# Patient Record
Sex: MALE | Race: WHITE | Employment: OTHER | URBAN - METROPOLITAN AREA
[De-identification: names, ages, dates, MRNs, and addresses within clinical notes are randomized per-mention and may not be internally consistent; named-entity substitution may affect disease eponyms.]

---

## 2018-06-28 ENCOUNTER — HOSPITAL ENCOUNTER (OUTPATIENT)
Dept: GENERAL RADIOLOGY | Age: 73
Discharge: HOME | End: 2018-06-28
Payer: COMMERCIAL

## 2018-06-28 DIAGNOSIS — R05.9 COUGH: ICD-10-CM

## 2018-06-28 PROCEDURE — 71046 X-RAY EXAM CHEST 2 VIEWS: CPT

## 2019-12-09 ENCOUNTER — HOSPITAL ENCOUNTER (OUTPATIENT)
Dept: ULTRASOUND IMAGING | Age: 74
Discharge: HOME | End: 2019-12-09
Attending: INTERNAL MEDICINE
Payer: COMMERCIAL

## 2019-12-09 DIAGNOSIS — R80.9 PROTEINURIA: ICD-10-CM

## 2019-12-09 PROCEDURE — 76770 US EXAM ABDO BACK WALL COMP: CPT

## 2020-01-15 ENCOUNTER — HOSPITAL ENCOUNTER (OUTPATIENT)
Dept: CT IMAGING | Age: 75
Discharge: HOME | End: 2020-01-15
Attending: INTERNAL MEDICINE

## 2020-01-20 ENCOUNTER — HOSPITAL ENCOUNTER (OUTPATIENT)
Dept: CT IMAGING | Age: 75
Setting detail: OBSERVATION
Discharge: HOME | End: 2020-01-21
Attending: INTERNAL MEDICINE | Admitting: INTERNAL MEDICINE
Payer: COMMERCIAL

## 2020-01-20 DIAGNOSIS — N18.2 CHRONIC KIDNEY DISEASE, STAGE II (MILD): ICD-10-CM

## 2020-01-20 DIAGNOSIS — I71.40 ABDOMINAL AORTIC ANEURYSM WITHOUT RUPTURE: ICD-10-CM

## 2020-01-20 PROBLEM — Z98.890 S/P BIOPSY: Status: ACTIVE | Noted: 2020-01-20

## 2020-01-20 LAB
ABO + RH BLD: NORMAL
BASOPHILS # BLD: 0 K/UL (ref 0–0.1)
BASOPHILS NFR BLD: 1 % (ref 0–1)
BLOOD GROUP ANTIBODIES SERPL: NORMAL
DIFFERENTIAL METHOD BLD: ABNORMAL
EOSINOPHIL # BLD: 0.1 K/UL (ref 0–0.4)
EOSINOPHIL NFR BLD: 2 % (ref 0–7)
ERYTHROCYTE [DISTWIDTH] IN BLOOD BY AUTOMATED COUNT: 13.1 % (ref 11.5–14.5)
HCT VFR BLD AUTO: 41.5 % (ref 36.6–50.3)
HCT VFR BLD AUTO: 43.1 % (ref 36.6–50.3)
HCT VFR BLD AUTO: 46.8 % (ref 36.6–50.3)
HGB BLD-MCNC: 16.1 G/DL (ref 12.1–17)
IMM GRANULOCYTES # BLD AUTO: 0 K/UL (ref 0–0.04)
IMM GRANULOCYTES NFR BLD AUTO: 1 % (ref 0–0.5)
LYMPHOCYTES # BLD: 1.7 K/UL (ref 0.8–3.5)
LYMPHOCYTES NFR BLD: 26 % (ref 12–49)
MCH RBC QN AUTO: 31.2 PG (ref 26–34)
MCHC RBC AUTO-ENTMCNC: 34.4 G/DL (ref 30–36.5)
MCV RBC AUTO: 90.7 FL (ref 80–99)
MONOCYTES # BLD: 0.7 K/UL (ref 0–1)
MONOCYTES NFR BLD: 11 % (ref 5–13)
NEUTS SEG # BLD: 3.8 K/UL (ref 1.8–8)
NEUTS SEG NFR BLD: 59 % (ref 32–75)
NRBC # BLD: 0 K/UL (ref 0–0.01)
NRBC BLD-RTO: 0 PER 100 WBC
PLATELET # BLD AUTO: 239 K/UL (ref 150–400)
PMV BLD AUTO: 8.6 FL (ref 8.9–12.9)
RBC # BLD AUTO: 5.16 M/UL (ref 4.1–5.7)
SPECIMEN EXP DATE BLD: NORMAL
WBC # BLD AUTO: 6.4 K/UL (ref 4.1–11.1)

## 2020-01-20 PROCEDURE — 85025 COMPLETE CBC W/AUTO DIFF WBC: CPT

## 2020-01-20 PROCEDURE — 77030003503 HC NDL BIOP TISS BD -B

## 2020-01-20 PROCEDURE — 85014 HEMATOCRIT: CPT

## 2020-01-20 PROCEDURE — 77030003666 HC NDL SPINAL BD -A

## 2020-01-20 PROCEDURE — 50200 RENAL BIOPSY PERQ: CPT

## 2020-01-20 PROCEDURE — 77030014115

## 2020-01-20 PROCEDURE — 36415 COLL VENOUS BLD VENIPUNCTURE: CPT

## 2020-01-20 PROCEDURE — 88342 IMHCHEM/IMCYTCHM 1ST ANTB: CPT

## 2020-01-20 PROCEDURE — 88350 IMFLUOR EA ADDL 1ANTB STN PX: CPT

## 2020-01-20 PROCEDURE — 88313 SPECIAL STAINS GROUP 2: CPT

## 2020-01-20 PROCEDURE — 86900 BLOOD TYPING SEROLOGIC ABO: CPT

## 2020-01-20 PROCEDURE — 88348 ELECTRON MICROSCOPY DX: CPT

## 2020-01-20 PROCEDURE — 88346 IMFLUOR 1ST 1ANTB STAIN PX: CPT

## 2020-01-20 PROCEDURE — 99218 HC RM OBSERVATION: CPT

## 2020-01-20 PROCEDURE — 88305 TISSUE EXAM BY PATHOLOGIST: CPT

## 2020-01-20 RX ORDER — CLONIDINE HYDROCHLORIDE 0.1 MG/1
0.1 TABLET ORAL AS NEEDED
Status: DISCONTINUED | OUTPATIENT
Start: 2020-01-20 | End: 2020-01-21 | Stop reason: HOSPADM

## 2020-01-20 RX ORDER — SODIUM CHLORIDE 0.9 % (FLUSH) 0.9 %
5-40 SYRINGE (ML) INJECTION AS NEEDED
Status: DISCONTINUED | OUTPATIENT
Start: 2020-01-20 | End: 2020-01-21 | Stop reason: HOSPADM

## 2020-01-20 RX ORDER — SODIUM CHLORIDE 0.9 % (FLUSH) 0.9 %
5-40 SYRINGE (ML) INJECTION EVERY 8 HOURS
Status: DISCONTINUED | OUTPATIENT
Start: 2020-01-20 | End: 2020-01-21 | Stop reason: HOSPADM

## 2020-01-20 RX ORDER — FENTANYL CITRATE 50 UG/ML
100 INJECTION, SOLUTION INTRAMUSCULAR; INTRAVENOUS
Status: DISCONTINUED | OUTPATIENT
Start: 2020-01-20 | End: 2020-01-20

## 2020-01-20 RX ORDER — METOPROLOL TARTRATE 50 MG/1
50 TABLET ORAL 2 TIMES DAILY
Status: DISCONTINUED | OUTPATIENT
Start: 2020-01-20 | End: 2020-01-21 | Stop reason: HOSPADM

## 2020-01-20 RX ORDER — MIDAZOLAM HYDROCHLORIDE 1 MG/ML
5 INJECTION, SOLUTION INTRAMUSCULAR; INTRAVENOUS
Status: DISCONTINUED | OUTPATIENT
Start: 2020-01-20 | End: 2020-01-20

## 2020-01-20 RX ADMIN — Medication 10 ML: at 22:00

## 2020-01-20 RX ADMIN — Medication 10 ML: at 14:00

## 2020-01-20 NOTE — H&P
[facility] Observation Note        NAME: Lauren Forbes       :  1945       MRN:  [id number]     Date/Time: 2020    Risk of deterioration: low       Assessment:    Plan:  Proteinuria  HTN S/p ct guided renal biopsy  Rack urine  Follow H/H  DC in AM if stable  D/W pt-no heavy lifting or straining x 2 weeks after dc. Subjective:     Chief Complaint:  No pain at biopsy site     Review of Systems: no n/v/flank pain/hematuria    Objective:     VITALS:   Last 24hrs VS reviewed since prior progress note. Most recent are:  Visit Vitals  /83 (BP 1 Location: Left arm)   Pulse 60   Temp 98.3 °F (36.8 °C)   Resp 16   Ht 6' (1.829 m)   Wt 98.9 kg (218 lb)   SpO2 97%   BMI 29.57 kg/m²     SpO2 Readings from Last 6 Encounters:   20 97%   12 98%   12 98%        No intake or output data in the 24 hours ending 20 1331         PHYSICAL EXAM:    General   well developed, well nourished, appears stated age, in no acute distress  Respiratory   Clear To Auscultation bilaterally   Cardiology  Regular Rate and Rythmn   Abdominal  Soft, non-tender, non-distended  Extremities  No clubbing, cyanosis, or edema. Pulses intact. Neurological  No focal neurological deficits noted  Psychological  Oriented x 3. Normal affect.               Lab Data Reviewed: (see below)    Medications Reviewed: (see below)    PMH/SH reviewed - no change compared to H&P  ______________________________________________  ___________________________________________________    Attending Physician: Janki Rodas MD     ____________________________________________________  MEDICATIONS:  Current Facility-Administered Medications   Medication Dose Route Frequency    sodium chloride (NS) flush 5-40 mL  5-40 mL IntraVENous Q8H    sodium chloride (NS) flush 5-40 mL  5-40 mL IntraVENous PRN    cloNIDine HCL (CATAPRES) tablet 0.1 mg  0.1 mg Oral PRN    metoprolol tartrate (LOPRESSOR) tablet 50 mg  50 mg Oral BID        LABS:  Recent Labs     01/20/20  0757   WBC 6.4   HGB 16.1   HCT 46.8        No results for input(s): NA, K, CL, CO2, BUN, CREA, GLU, CA, MG, PHOS, URICA, PTH, XPTHCT, PTHN, XPTHNT in the last 72 hours. No lab exists for component: IPTH  No results for input(s): SGOT, GPT, ALT, AP, TBIL, TBILI, TP, ALB, GLOB, GGT, AML, LPSE in the last 72 hours. No lab exists for component: AMYP, HLPSE  No results for input(s): INR, PTP, APTT, INREXT in the last 72 hours. No results for input(s): FE, TIBC, PSAT, FERR in the last 72 hours. No results for input(s): PH, PCO2, PO2 in the last 72 hours. No results for input(s): CPK, CKNDX, TROIQ in the last 72 hours.     No lab exists for component: CPKMB  Lab Results   Component Value Date/Time    Glucose (POC) 127 (H) 10/17/2009 09:33 AM

## 2020-01-20 NOTE — PROGRESS NOTES
Pt transported to via stretcher to room #1116  TRANSFER - OUT REPORT:    bedside report given to Ambreen Das, pt's nurse, on Robinson Howard  being transferred to room #0371 for ordered procedure of CT guided renal biopsy - pt requested no to have conscious sedation if he did not want it. Had medication in hand however pt did not want versed or fentanyl during the procedure. Pt denies pain or discomfort post procedure. Report consisted of patients Situation, Background, Assessment and Recommendations(SBAR). Information from the following report(s) Procedure Summary was reviewed with the receiving nurse. Lines: 20G LAC saline lock - type and screen and CBC placed in CC and sent to lab prior to procedure. Opportunity for questions and clarification was provided.

## 2020-01-20 NOTE — PROGRESS NOTES
Initial Nutrition Assessment:    INTERVENTIONS/RECOMMENDATIONS:   · Meals/Snacks: General/healthful diet: Continue cardiac diet as ordered. ASSESSMENT:   1/20:  Chart reviewed; med noted for biopsy. Hx of HTN, elevated cholesterol. MST trigger for weight loss >33 lbs. Visited pt at bedside; visitor present. Per visitor, reports pt recently experienced unintentional weight loss >30 lbs x 2 months without any change in po intake or routine. Also, reports weight has started increasing. Pt reports current weight ~205 lbs, documented weight 218 lbs. Pt reports MD encouraged sodium restriction due to renal concerns. RD briefly discussed strategies/tips to reduce sodium intake but pt not receptive at this time. Wife also reports she encourages pt to consume water. Pt reports mostly consumes coffee and milk. Not interested in any diet/lifetsyle change at this time. Diet Order: Cardiac  % Eaten:  No data found. %Supplement Intake:    Pertinent Medications: [x]Reviewed []Other  Pertinent Labs: [x]Reviewed []Other  Food Allergies: [x]None []Other   Last BM:    [x]Active     []Hyperactive  []Hypoactive       [] Absent BS  Skin:    [x] Intact   [] Incision  [] Breakdown  [] Other:    Anthropometrics:   Height: 6' (182.9 cm) Weight: 98.9 kg (218 lb)   IBW (%IBW):   ( ) UBW (%UBW):   (  %)   Last Weight Metrics:  Weight Loss Metrics 1/20/2020 4/19/2012 4/3/2012   Today's Wt 218 lb 218 lb 14.4 oz 214 lb   BMI 29.57 kg/m2 30.11 kg/m2 29.02 kg/m2       BMI: Body mass index is 29.57 kg/m². This BMI is indicative of:   []Underweight    []Normal    [x]Overweight    [] Obesity   [] Extreme Obesity (BMI>40)     Estimated Nutrition Needs (Based on):   8974 Kcals/day(BMR (1758) x 1. 3AF) , 99 g(1.0 g/kg bw) Protein  Carbohydrate:  At Least 130 g/day  Fluids: 2300 mL/day (1ml/kcal)    NUTRITION DIAGNOSES:   Problem:  No nutritional diagnosis at this time      Etiology: related to       Signs/Symptoms: as evidenced by NUTRITION INTERVENTIONS:  Meals/Snacks: General/healthful diet                  GOAL:   PO intake >50% of meals next 5-7 days    LEARNING NEEDS (Diet, Food/Nutrient-Drug Interaction):    [x] None Identified   [] Identified and Education Provided/Documented   [] Identified and Pt declined/was not appropriate     Cultureal, Jain, OR Ethnic Dietary Needs:    [x] None Identified   [] Identified and Addressed     [x] Interdisciplinary Care Plan Reviewed/Documented    [x] Discharge Planning:  Recommend cardiac diet per hx of HTN and elevated lipids      MONITORING /EVALUATION:   Food/Nutrient Intake Outcomes:  Total energy intake  Physical Signs/Symptoms Outcomes: Weight/weight change, Electrolyte and renal profile    NUTRITION RISK:    [x] Patient At Nutritional Risk        [] Patient Not At Nutritional Risk    PT SEEN FOR:    []  MD Consult: []Calorie Count      []Diabetic Diet Education        []Diet Education     []Electrolyte Management     []General Nutrition Management and Supplements     []Management of Tube Feeding     []TPN Recommendations    [x]  RN Referral:  [x]MST score >=2     []Enteral/Parenteral Nutrition PTA     []Pregnant: Gestational DM or Multigestation     []Pressure Ulcer/Wound Care needs        []  Low BMI  []  FABIENNE Meyer, [address] [place] [address]  Pager [phone]  Weekend Pager [phone]

## 2020-01-20 NOTE — H&P
Interventional Radiology History and Physical (Inpatient)    Patient: Jose Matthews 66 y.o. male     Referring Physician:  Natasha Stevens MD    Chief Complaint: No chief complaint on file. History of Present Illness: conscious sedation (Versed and fentanyl) for renal biopsy    History:  Past Medical History:   Diagnosis Date    Hypercholesteremia     Hypertension      No family history on file.   Social History     Socioeconomic History    Marital status:      Spouse name: Not on file    Number of children: Not on file    Years of education: Not on file    Highest education level: Not on file   Occupational History    Not on file   Social Needs    Financial resource strain: Not on file    Food insecurity:     Worry: Not on file     Inability: Not on file    Transportation needs:     Medical: Not on file     Non-medical: Not on file   Tobacco Use    Smoking status: Current Every Day Smoker     Packs/day: 2.00     Years: 60.00     Pack years: 120.00     Types: Cigarettes   Substance and Sexual Activity    Alcohol use: No    Drug use: Not on file    Sexual activity: Not on file   Lifestyle    Physical activity:     Days per week: Not on file     Minutes per session: Not on file    Stress: Not on file   Relationships    Social connections:     Talks on phone: Not on file     Gets together: Not on file     Attends [employer] service: Not on file     Active member of club or organization: Not on file     Attends meetings of clubs or organizations: Not on file     Relationship status: Not on file    Intimate partner violence:     Fear of current or ex partner: Not on file     Emotionally abused: Not on file     Physically abused: Not on file     Forced sexual activity: Not on file   Other Topics Concern    Not on file   Social History Narrative    Not on file       Allergies: No Known Allergies    Current Medications:  Current Outpatient Medications   Medication Sig    niacin (Barrera Dumont) 1,000 mg Tb24 tab Take  by mouth nightly.  metoprolol (LOPRESSOR) 50 mg tablet Take  by mouth two (2) times a day.  simvastatin (ZOCOR) 40 mg tablet Take  by mouth nightly.  aspirin 81 mg tablet Take 81 mg by mouth.  multivitamin (ONE A DAY) tablet Take 1 Tab by mouth daily.  Omega-3 Fatty Acids (FISH OIL) 300 mg Cap Take  by mouth. No current facility-administered medications for this encounter. Physical Exam:  Blood pressure 133/75, pulse 63, temperature 97.6 °F (36.4 °C), resp. rate 20, height 6' (1.829 m), weight 98.9 kg (218 lb), SpO2 99 %. GENERAL: alert, cooperative, no distress, appears stated age, LUNG: clear to auscultation bilaterally, HEART: distant sounds, regular rate and rhythm    Findings/Diagnosis: conscious sedation (Versed and fentanyl) for renal biopsy    Alerts:    Hospital Problems  Date Reviewed: 4/19/2012          Codes Class Noted POA    S/P biopsy ICD-10-CM: Z98.890  ICD-9-CM: V45.89  1/20/2020 Unknown              Laboratory:    No results for input(s): HGB, HGBEXT, HCT, HCTEXT, WBC, PLT, PLTEXT, INR, BUN, CREA, K, CRCLT, HGBEXT, HCTEXT, PLTEXT, INREXT in the last 72 hours. No lab exists for component: PTT, PT      Plan of Care/Planned Procedure:  Risks, benefits, and alternatives reviewed with patient and he agrees to proceed with the procedure. Full dictated report to follow.

## 2020-01-20 NOTE — DISCHARGE SUMMARY
[facility] DISCHARGE INSTRUCTIONS  NAME: Александр Sandoval   :  1945   MRN:  [id number]     Date/Time:  2020 1:37 PM    ADMIT DATE: 2020     DISCHARGE DATE: 20    ADMITTING DIAGNOSIS:  Proteinuria    DISCHARGE DIAGNOSIS:   s/p ct guided biopsy    MEDICATIONS:  Current Discharge Medication List      CONTINUE these medications which have NOT CHANGED    Details   niacin (NIASPAN) 1,000 mg Tb24 tab Take  by mouth nightly. metoprolol (LOPRESSOR) 50 mg tablet Take  by mouth two (2) times a day. simvastatin (ZOCOR) 40 mg tablet Take  by mouth nightly. aspirin 81 mg tablet Take 81 mg by mouth.        multivitamin (ONE A DAY) tablet Take 1 Tab by mouth daily. Omega-3 Fatty Acids (FISH OIL) 300 mg Cap Take  by mouth. · It is important that you take the medication exactly as they are prescribed. You may resume your asprin on sat. · Keep your medication in the bottles provided by the pharmacist and keep a list of the medication names, dosages, and times to be taken in your wallet. · Do not take other medications without consulting your doctor. Pain Management: per above medications    What to do at Home--any pain at biopsy site or bloody urine return to ED immediately    Recommended diet:  Regular Diet    Recommended activity: no heavy lifting for 2 weeks                                             No heavy straining for 2 weeks  If you experience any of the following symptoms then please call your primary care physician or return to the emergency room if you cannot get hold of your doctor:  Fever, chills, nausea, vomiting, diarrhea, change in mentation, falling, bleeding, shortness of breath, bruising or blood in urine. Follow Up:  Dr. Yevgeniy Mitchell obtained by :  I understand that if any problems occur once I am at home I am to contact my physician. I understand and acknowledge receipt of the instructions indicated above. Physician's or R.N.'s Signature                                                                  Date/Time                                                                                                                                                Patient or Representative Signature                                                          Date/Time  Amina Rodrigues MD

## 2020-01-20 NOTE — ROUTINE PROCESS
Dr. Sea Newsomepool Present for pre procedure consult and consent - questions reviewed with understanding - consent signed. Admitting orders placed and víctor benitez/Alan in bed placement, awaiting room assignment.

## 2020-01-20 NOTE — PROGRESS NOTES
TRANSFER - IN REPORT:    Verbal report received from Jeana Ramirez RN(name) on Prem Donahue  being received from X-ray recovery(unit) for routine progression of care      Report consisted of patients Situation, Background, Assessment and   Recommendations(SBAR). Information from the following report(s) SBAR, Kardex, MAR and Accordion was reviewed with the receiving nurse. Opportunity for questions and clarification was provided. Assessment will be completed upon patients arrival to unit and care assumed.

## 2020-01-20 NOTE — DISCHARGE SUMMARY
Physician Discharge Summary     Patient ID:  Jazmine Major  [id number]  59 y.o.  1945    Admit date: 1/20/2020    Discharge date and time: 1/21/20       Admitting Physician: Main Spicer    Discharge Physician: Main Spicer MD    Admission Diagnoses: S/P biopsy [Z98.890]    Discharge Diagnoses: as above    Discharged Condition: stable      Hospital Course: pt observed overnight following a ct guided renal biopsy. Urine was racked. Hgb stable  No flank pain/bruising noted on exam. Reviewed with pt/family no heavy lifting or straining x 2 weeks. ANy hematuria or flank pain return to ED. Resume aspirin on sat    Disposition: home    Patient Instructions:     Current Discharge Medication List      CONTINUE these medications which have NOT CHANGED    Details   niacin (NIASPAN) 1,000 mg Tb24 tab Take  by mouth nightly. metoprolol (LOPRESSOR) 50 mg tablet Take  by mouth two (2) times a day. simvastatin (ZOCOR) 40 mg tablet Take  by mouth nightly. aspirin 81 mg tablet Take 81 mg by mouth.        multivitamin (ONE A DAY) tablet Take 1 Tab by mouth daily. Omega-3 Fatty Acids (FISH OIL) 300 mg Cap Take  by mouth.                Data Review   CBC:   Lab Results   Component Value Date/Time    WBC 6.4 01/20/2020 07:57 AM    RBC 5.16 01/20/2020 07:57 AM    HGB 16.1 01/20/2020 07:57 AM    HCT 46.8 01/20/2020 07:57 AM    PLATELET 739 86/90/9127 07:57 AM   , BMP:   Lab Results   Component Value Date/Time    Glucose 114 (H) 12/10/2009 07:50 PM    Sodium 142 12/10/2009 07:50 PM    Potassium 4.3 12/10/2009 07:50 PM    Chloride 106 12/10/2009 07:50 PM    CO2 26 12/10/2009 07:50 PM    BUN 18 12/10/2009 07:50 PM    Creatinine 1.0 12/10/2009 07:50 PM    Calcium 9.7 12/10/2009 07:50 PM       Activity: No heavy lifting, pushing, pulling x 2 weeks    Diet: Regular    Wound Care: None needed    Follow-up with dr Michelle Lagos assi\    Signed:  Main Spicer MD  1/20/2020  1:34 PM

## 2020-01-21 VITALS
DIASTOLIC BLOOD PRESSURE: 86 MMHG | RESPIRATION RATE: 16 BRPM | SYSTOLIC BLOOD PRESSURE: 143 MMHG | WEIGHT: 218 LBS | BODY MASS INDEX: 29.53 KG/M2 | OXYGEN SATURATION: 99 % | TEMPERATURE: 98.3 F | HEART RATE: 64 BPM | HEIGHT: 72 IN

## 2020-01-21 PROCEDURE — 99218 HC RM OBSERVATION: CPT

## 2020-01-21 RX ADMIN — Medication 10 ML: at 06:00

## 2020-01-21 NOTE — PROGRESS NOTES
Patient request not to be disturbed. Patient states like to get some sleep. Informed patient will check on him but will not wake him up. Informed patient to call me when he gets up to go to BR so that I can rack his urine. Patient states understanding.

## 2020-01-21 NOTE — PROGRESS NOTES
Bedside shift change report given to [facility] (oncoming nurse) by Dylan Cleaning RN (offgoing nurse). Report included the following information SBAR, Kardex, ED Summary, Intake/Output, MAR and Recent Results.

## 2020-01-21 NOTE — PROGRESS NOTES
CRUZITO:   1) Home with f.u appts     8:41 AM- D/C order noted. Medicare pt has received, reviewed, and signed 2nd IM letter informing them of their right to appeal the discharge. Signed copy has been placed on pt bedside chart. Pt has follow up appts with his providers already arranged. Pt's wife is on her way. Pt is cleared to d/c from CM perspective, RN informed.      NICHOL Headley, [address] [facility]   [phone]

## 2020-01-21 NOTE — PROGRESS NOTES
I have reviewed discharge instructions with the PATIENT PARENT GUARDIAN: patient. The patient verbalized understanding. Discharge medications reviewed with patient and appropriate educational materials and side effects teaching were provided. Follow-up appointments reviewed. Opportunity for questions and clarification was provided. Venous access removed without difficulty. Patient's belongings gathered and sent with patient. Patient is ready for discharge.      Solis Miner

## 2020-02-01 ENCOUNTER — HOSPITAL ENCOUNTER (OUTPATIENT)
Dept: CT IMAGING | Age: 75
Discharge: HOME | End: 2020-02-01
Attending: INTERNAL MEDICINE
Payer: COMMERCIAL

## 2020-02-01 DIAGNOSIS — N03.2 CHRONIC MEMBRANOUS NEPHRITIS: ICD-10-CM

## 2020-02-01 DIAGNOSIS — R10.13 EPIGASTRIC PAIN: ICD-10-CM

## 2020-02-01 LAB — CREAT BLD-MCNC: 1.2 MG/DL (ref 0.6–1.3)

## 2020-02-01 PROCEDURE — 71260 CT THORAX DX C+: CPT

## 2020-02-01 PROCEDURE — 74011636320 HC RX REV CODE- 636/320

## 2020-02-01 PROCEDURE — 82565 ASSAY OF CREATININE: CPT

## 2020-02-01 PROCEDURE — 74177 CT ABD & PELVIS W/CONTRAST: CPT

## 2020-02-01 RX ORDER — SODIUM CHLORIDE 0.9 % (FLUSH) 0.9 %
10 SYRINGE (ML) INJECTION
Status: COMPLETED | OUTPATIENT
Start: 2020-02-01 | End: 2020-02-01

## 2020-02-01 RX ADMIN — Medication 10 ML: at 10:24

## 2020-02-01 RX ADMIN — IOPAMIDOL 100 ML: 755 INJECTION, SOLUTION INTRAVENOUS at 10:24

## 2020-04-15 ENCOUNTER — HOSPITAL ENCOUNTER (OUTPATIENT)
Dept: NUCLEAR MEDICINE | Age: 75
Discharge: HOME | End: 2020-04-15
Attending: RADIOLOGY
Payer: COMMERCIAL

## 2020-04-15 DIAGNOSIS — C61 MALIGNANT NEOPLASM OF PROSTATE (HCC): ICD-10-CM

## 2020-04-15 PROCEDURE — 78306 BONE IMAGING WHOLE BODY: CPT

## 2020-07-06 ENCOUNTER — HOSPITAL ENCOUNTER (OUTPATIENT)
Dept: RADIATION THERAPY | Age: 75
Discharge: HOME | End: 2020-07-06

## 2020-07-07 ENCOUNTER — HOSPITAL ENCOUNTER (OUTPATIENT)
Dept: RADIATION THERAPY | Age: 75
Discharge: HOME | End: 2020-07-07

## 2020-07-08 ENCOUNTER — HOSPITAL ENCOUNTER (OUTPATIENT)
Dept: RADIATION THERAPY | Age: 75
Discharge: HOME | End: 2020-07-08

## 2020-07-09 ENCOUNTER — HOSPITAL ENCOUNTER (OUTPATIENT)
Dept: RADIATION THERAPY | Age: 75
Discharge: HOME | End: 2020-07-09

## 2020-07-10 ENCOUNTER — HOSPITAL ENCOUNTER (OUTPATIENT)
Dept: RADIATION THERAPY | Age: 75
Discharge: HOME | End: 2020-07-10

## 2020-07-13 ENCOUNTER — HOSPITAL ENCOUNTER (OUTPATIENT)
Dept: RADIATION THERAPY | Age: 75
Discharge: HOME | End: 2020-07-13

## 2020-07-14 ENCOUNTER — HOSPITAL ENCOUNTER (OUTPATIENT)
Dept: RADIATION THERAPY | Age: 75
Discharge: HOME | End: 2020-07-14

## 2020-07-15 ENCOUNTER — HOSPITAL ENCOUNTER (OUTPATIENT)
Dept: RADIATION THERAPY | Age: 75
Discharge: HOME | End: 2020-07-15

## 2020-07-16 ENCOUNTER — HOSPITAL ENCOUNTER (OUTPATIENT)
Dept: RADIATION THERAPY | Age: 75
Discharge: HOME | End: 2020-07-16

## 2020-07-17 ENCOUNTER — HOSPITAL ENCOUNTER (OUTPATIENT)
Dept: RADIATION THERAPY | Age: 75
Discharge: HOME | End: 2020-07-17

## 2020-07-20 ENCOUNTER — HOSPITAL ENCOUNTER (OUTPATIENT)
Dept: RADIATION THERAPY | Age: 75
Discharge: HOME | End: 2020-07-20

## 2020-07-21 ENCOUNTER — HOSPITAL ENCOUNTER (OUTPATIENT)
Dept: RADIATION THERAPY | Age: 75
Discharge: HOME | End: 2020-07-21

## 2020-07-22 ENCOUNTER — HOSPITAL ENCOUNTER (OUTPATIENT)
Dept: RADIATION THERAPY | Age: 75
Discharge: HOME | End: 2020-07-22

## 2020-07-23 ENCOUNTER — HOSPITAL ENCOUNTER (OUTPATIENT)
Dept: RADIATION THERAPY | Age: 75
Discharge: HOME | End: 2020-07-23

## 2020-07-24 ENCOUNTER — HOSPITAL ENCOUNTER (OUTPATIENT)
Dept: RADIATION THERAPY | Age: 75
Discharge: HOME | End: 2020-07-24

## 2020-07-27 ENCOUNTER — HOSPITAL ENCOUNTER (OUTPATIENT)
Dept: RADIATION THERAPY | Age: 75
Discharge: HOME | End: 2020-07-27

## 2020-07-28 ENCOUNTER — HOSPITAL ENCOUNTER (OUTPATIENT)
Dept: RADIATION THERAPY | Age: 75
Discharge: HOME | End: 2020-07-28

## 2020-07-28 ENCOUNTER — TELEPHONE (OUTPATIENT)
Dept: RADIATION ONCOLOGY | Age: 75
End: 2020-07-28

## 2020-07-28 DIAGNOSIS — C61 PROSTATE CANCER (HCC): Primary | ICD-10-CM

## 2020-07-28 RX ORDER — DIPHENOXYLATE HYDROCHLORIDE AND ATROPINE SULFATE 2.5; .025 MG/1; MG/1
1 TABLET ORAL
Qty: 120 TAB | Refills: 0 | Status: SHIPPED | OUTPATIENT
Start: 2020-07-28

## 2020-07-29 ENCOUNTER — HOSPITAL ENCOUNTER (OUTPATIENT)
Dept: RADIATION THERAPY | Age: 75
Discharge: HOME | End: 2020-07-29

## 2020-07-30 ENCOUNTER — HOSPITAL ENCOUNTER (OUTPATIENT)
Dept: RADIATION THERAPY | Age: 75
Discharge: HOME | End: 2020-07-30

## 2020-07-31 ENCOUNTER — HOSPITAL ENCOUNTER (OUTPATIENT)
Dept: RADIATION THERAPY | Age: 75
Discharge: HOME | End: 2020-07-31

## 2020-08-03 ENCOUNTER — HOSPITAL ENCOUNTER (OUTPATIENT)
Dept: RADIATION THERAPY | Age: 75
Discharge: HOME | End: 2020-08-03

## 2020-08-04 ENCOUNTER — HOSPITAL ENCOUNTER (OUTPATIENT)
Dept: RADIATION THERAPY | Age: 75
Discharge: HOME | End: 2020-08-04

## 2020-08-05 ENCOUNTER — HOSPITAL ENCOUNTER (OUTPATIENT)
Dept: RADIATION THERAPY | Age: 75
Discharge: HOME | End: 2020-08-05

## 2020-08-07 ENCOUNTER — HOSPITAL ENCOUNTER (OUTPATIENT)
Dept: RADIATION THERAPY | Age: 75
Discharge: HOME | End: 2020-08-07

## 2020-08-10 ENCOUNTER — HOSPITAL ENCOUNTER (OUTPATIENT)
Dept: RADIATION THERAPY | Age: 75
Discharge: HOME | End: 2020-08-10

## 2020-08-11 ENCOUNTER — HOSPITAL ENCOUNTER (OUTPATIENT)
Dept: RADIATION THERAPY | Age: 75
Discharge: HOME | End: 2020-08-11

## 2020-08-12 ENCOUNTER — HOSPITAL ENCOUNTER (OUTPATIENT)
Dept: RADIATION THERAPY | Age: 75
Discharge: HOME | End: 2020-08-12

## 2020-08-13 ENCOUNTER — HOSPITAL ENCOUNTER (OUTPATIENT)
Dept: RADIATION THERAPY | Age: 75
Discharge: HOME | End: 2020-08-13

## 2020-08-14 ENCOUNTER — HOSPITAL ENCOUNTER (OUTPATIENT)
Dept: RADIATION THERAPY | Age: 75
Discharge: HOME | End: 2020-08-14

## 2020-08-17 ENCOUNTER — HOSPITAL ENCOUNTER (OUTPATIENT)
Dept: RADIATION THERAPY | Age: 75
Discharge: HOME | End: 2020-08-17

## 2020-08-18 ENCOUNTER — HOSPITAL ENCOUNTER (OUTPATIENT)
Dept: RADIATION THERAPY | Age: 75
Discharge: HOME | End: 2020-08-18

## 2020-08-19 ENCOUNTER — HOSPITAL ENCOUNTER (OUTPATIENT)
Dept: RADIATION THERAPY | Age: 75
Discharge: HOME | End: 2020-08-19

## 2020-08-20 ENCOUNTER — HOSPITAL ENCOUNTER (OUTPATIENT)
Dept: RADIATION THERAPY | Age: 75
Discharge: HOME | End: 2020-08-20

## 2020-08-21 ENCOUNTER — HOSPITAL ENCOUNTER (OUTPATIENT)
Dept: RADIATION THERAPY | Age: 75
Discharge: HOME | End: 2020-08-21

## 2020-08-24 ENCOUNTER — HOSPITAL ENCOUNTER (OUTPATIENT)
Dept: RADIATION THERAPY | Age: 75
Discharge: HOME | End: 2020-08-24

## 2020-08-25 ENCOUNTER — HOSPITAL ENCOUNTER (OUTPATIENT)
Dept: RADIATION THERAPY | Age: 75
Discharge: HOME | End: 2020-08-25

## 2020-08-26 ENCOUNTER — HOSPITAL ENCOUNTER (OUTPATIENT)
Dept: RADIATION THERAPY | Age: 75
Discharge: HOME | End: 2020-08-26

## 2020-08-27 ENCOUNTER — HOSPITAL ENCOUNTER (OUTPATIENT)
Dept: RADIATION THERAPY | Age: 75
Discharge: HOME | End: 2020-08-27

## 2020-08-28 ENCOUNTER — HOSPITAL ENCOUNTER (OUTPATIENT)
Dept: RADIATION THERAPY | Age: 75
Discharge: HOME | End: 2020-08-28

## 2020-08-31 ENCOUNTER — HOSPITAL ENCOUNTER (OUTPATIENT)
Dept: RADIATION THERAPY | Age: 75
Discharge: HOME | End: 2020-08-31

## 2020-09-01 ENCOUNTER — HOSPITAL ENCOUNTER (OUTPATIENT)
Dept: RADIATION THERAPY | Age: 75
Discharge: HOME | End: 2020-09-01

## 2020-09-02 ENCOUNTER — HOSPITAL ENCOUNTER (OUTPATIENT)
Dept: RADIATION THERAPY | Age: 75
Discharge: HOME | End: 2020-09-02

## 2020-09-03 ENCOUNTER — HOSPITAL ENCOUNTER (OUTPATIENT)
Dept: RADIATION THERAPY | Age: 75
Discharge: HOME | End: 2020-09-03

## 2020-09-04 ENCOUNTER — HOSPITAL ENCOUNTER (OUTPATIENT)
Dept: RADIATION THERAPY | Age: 75
Discharge: HOME | End: 2020-09-04

## 2020-09-08 ENCOUNTER — TELEPHONE (OUTPATIENT)
Dept: RADIATION ONCOLOGY | Age: 75
End: 2020-09-08

## 2020-09-08 RX ORDER — TAMSULOSIN HYDROCHLORIDE 0.4 MG/1
0.4 CAPSULE ORAL
Qty: 30 CAP | Refills: 11 | Status: SHIPPED | OUTPATIENT
Start: 2020-09-08

## 2022-03-18 PROBLEM — Z98.890 S/P BIOPSY: Status: ACTIVE | Noted: 2020-01-20

## 2022-08-12 ENCOUNTER — TRANSCRIBE ORDER (OUTPATIENT)
Dept: SCHEDULING | Age: 77
End: 2022-08-12

## 2022-08-12 DIAGNOSIS — N18.30 CHRONIC KIDNEY DISEASE, STAGE III (MODERATE) (HCC): Primary | ICD-10-CM

## 2022-08-25 ENCOUNTER — HOSPITAL ENCOUNTER (OUTPATIENT)
Dept: ULTRASOUND IMAGING | Age: 77
Discharge: HOME | End: 2022-08-25
Attending: INTERNAL MEDICINE
Payer: COMMERCIAL

## 2022-08-25 ENCOUNTER — HOSPITAL ENCOUNTER (OUTPATIENT)
Dept: VASCULAR SURGERY | Age: 77
Discharge: HOME | End: 2022-08-25
Attending: INTERNAL MEDICINE
Payer: COMMERCIAL

## 2022-08-25 DIAGNOSIS — N18.30 CHRONIC KIDNEY DISEASE, STAGE III (MODERATE) (HCC): ICD-10-CM

## 2022-08-25 PROCEDURE — 76770 US EXAM ABDO BACK WALL COMP: CPT

## 2022-08-25 PROCEDURE — 93975 VASCULAR STUDY: CPT

## 2022-08-26 LAB
ABDOMINAL PROX AORTA VEL: 95.2 CM/S
CELIAC EDV: 34.8 CM/S
CELIAC PSV: 179.1 CM/S
DIST AORTIC AP: 2.73 CM
LEFT KIDNEY LENGTH: 10.14 CM
LEFT KIDNEY WIDTH: 3.23 CM
PROX AORTIC AP: 2.46 CM
PROX SMA EDV: 9.8 CM/S
PROX SMA PSV: 152.6 CM/S
RIGHT KIDNEY LENGTH: 12.67 CM
RIGHT KIDNEY WIDTH: 5.25 CM
RIGHT RENAL DIST DIAS: 12.8 CM/S
RIGHT RENAL DIST RAR: 0.64
RIGHT RENAL DIST RI: 0.79
RIGHT RENAL DIST SYS: 60.6 CM/S
RIGHT RENAL LOWER PARENCHYMA MAX: 15.9 CM/S
RIGHT RENAL LOWER PARENCHYMA MIN: 6.1 CM/S
RIGHT RENAL LOWER PARENCHYMA RI: 0.62
RIGHT RENAL MID DIAS: 15.2 CM/S
RIGHT RENAL MID RAR: 0.58
RIGHT RENAL MID RI: 0.73
RIGHT RENAL MID SYS: 55.6 CM/S
RIGHT RENAL MIDDLE PARENCHYMA MAX: 20.7 CM/S
RIGHT RENAL MIDDLE PARENCHYMA MIN: 8.7 CM/S
RIGHT RENAL MIDDLE PARENCHYMA RI: 0.58
RIGHT RENAL PROX DIAS: 38.6 CM/S
RIGHT RENAL PROX RAR: 1.65
RIGHT RENAL PROX RI: 0.75
RIGHT RENAL PROX SYS: 157.2 CM/S
RIGHT RENAL UPPER PARENCHYMA MAX: 38.3 CM/S
RIGHT RENAL UPPER PARENCHYMA MIN: 12 CM/S
RIGHT RENAL UPPER PARENCHYMA RI: 0.69